# Patient Record
Sex: MALE | Race: WHITE
[De-identification: names, ages, dates, MRNs, and addresses within clinical notes are randomized per-mention and may not be internally consistent; named-entity substitution may affect disease eponyms.]

---

## 2018-08-20 ENCOUNTER — HOSPITAL ENCOUNTER (INPATIENT)
Dept: HOSPITAL 68 - SDA | Age: 53
Discharge: HOME HEALTH | DRG: 470 | End: 2018-08-20
Attending: ORTHOPAEDIC SURGERY | Admitting: ORTHOPAEDIC SURGERY
Payer: COMMERCIAL

## 2018-08-20 VITALS — HEIGHT: 70 IN | WEIGHT: 280 LBS | BODY MASS INDEX: 40.09 KG/M2

## 2018-08-20 VITALS — SYSTOLIC BLOOD PRESSURE: 146 MMHG | DIASTOLIC BLOOD PRESSURE: 84 MMHG

## 2018-08-20 VITALS — DIASTOLIC BLOOD PRESSURE: 86 MMHG | SYSTOLIC BLOOD PRESSURE: 154 MMHG

## 2018-08-20 VITALS — DIASTOLIC BLOOD PRESSURE: 90 MMHG | SYSTOLIC BLOOD PRESSURE: 160 MMHG

## 2018-08-20 DIAGNOSIS — I10: ICD-10-CM

## 2018-08-20 DIAGNOSIS — F41.9: ICD-10-CM

## 2018-08-20 DIAGNOSIS — M16.11: Primary | ICD-10-CM

## 2018-08-20 DIAGNOSIS — F32.9: ICD-10-CM

## 2018-08-20 DIAGNOSIS — E78.5: ICD-10-CM

## 2018-08-20 PROCEDURE — 0SR904A REPLACEMENT OF RIGHT HIP JOINT WITH CERAMIC ON POLYETHYLENE SYNTHETIC SUBSTITUTE, UNCEMENTED, OPEN APPROACH: ICD-10-PCS | Performed by: ORTHOPAEDIC SURGERY

## 2018-08-20 PROCEDURE — 2NBSP: CPT

## 2018-08-20 NOTE — PATIENT DISCHARGE INSTRUCTIONS
Discharge Instructions
 
General Discharge Information
You were seen/treated for:
Right hip pain
You had these procedures:
Right total hip arthroplasty
Watch for these problems:
Fever over 100.4
Drainage from wound
Redness and swelling around wound
Unable to bear weight on right lower extremity
Chest pain or shortness of breath
Call Surgeon to remove: Other
Do not soak the wound: Yes
No bath, but you may shower: Yes
Other wound care:
Daily dry dressing change
Special Instructions:
Okay to shower in 48hr
 
Diet
Continue normal diet: Yes
 
Activity
Activity Limited to: Weight bear as tolerated (with rolling walker)
 
Acute Coronary Syndrome
 
Inclusion Criteria
At DC or during hospital stay patient has or had the following:
ACS DIAGNOSIS No
 
Discharge Core Measures
Meds if any: Prescribed or Continued at Discharge
Meds if any: NOT Prescribed or Continued at Discharge
 
Congestive Heart Failure
 
Inclusion Criteria
At DC or during hospital stay patient has or had the following:
CHF DIAGNOSIS No
 
Discharge Core Measures
Meds if any: Prescribed or Continued at Discharge
Meds if any: NOT Prescribed or Continued at Discharge
 
Cerebrovascular accident
 
Inclusion Criteria
At DC or during hospital stay patient has or had the following:
CVA/TIA Diagnosis No
 
Discharge Core Measures
Meds if any: Prescribed or Continued at Discharge
Meds if any: NOT Prescribed or Continued at Discharge
 
Venous thromboembolism
 
Inclusion Criteria
VTE Diagnosis No
VTE Type NONE
VTE Confirmed by (Test) NONE
 
Discharge Core Measures
- Per Current guidelines, there needs to be overlap
- treatment for the first 5 days of Warfarin therapy.
- If discharged on Warfarin prior to 5 days of
- overlap therapy, the patient will need to be
- assessed for post discharge needs including
- *Post discharge parental anticoagulation
- *Warfarin and/or parental anticoagulation education
- *Follow up date to check INR post discharge
At least 5 days overlap therapy as Inpatient No
Meds if any: Prescribed or Continued at Discharge
Note: Overlap Therapy is Warfarin and Anticoagulant
Meds if any: NOT Prescribed or Continued at Discharge

## 2018-08-20 NOTE — OPERATIVE REPORT
Operative/Inv Procedure Report
Surgery Date: 08/20/18
Name of Procedure:
Right total hip replacement
Pre-Operative Diagnosis:
Primary right hip DJD
Post-Operative Diagnosis:
Same
Estimated Blood Loss: 300
Surgeon/Assistant:
Sanjuana WOOD,Ramesh Perry
Anesthesia: block
 
Operative/Procedure Note
Note:
Description of Procedure:
 
The patient was taken to the operating room and positively identified.  After 
induction of spinal anesthesia and administration of appropriate pre-operative 
antibiotics, the patient was positioned supine on the operating room table and 
all bony prominences were well padded.  After performing a surgical timeout, the
right lower extremity was prepped and draped in the usual sterile fashion.
 
A direct anterior approach was made to the right hip.  The incision was carried 
sharply through superficial soft tissues to the level of the fascia.  Meticulous
hemostasis was maintained with Bovie electocautery.  The fascia over the tensor 
fascia faith muscle was opened sharply and the interval between the TFL and the 
sartorius was entered bluntly taking care to stay lateral to the lateral femoral
cutaneous nerve.  Retractors were placed around the femoral neck and the 
pericapsular fat was identified.  The ascending branches of the lateral femoral 
circumflex vessels were identified and carefully coagulated.  The pericapsular 
fat and anterior capsule were then resected.  A napkin ring osteotomy was 
performed and the femoral head was removed without difficulty.
 
Attention was then turned to the acetabulum.  After appropriate placement of 
retractors, the acetabulum was exposed.  Soft tissue was cleaned from the 
acetabular margin and notch.  Overhanging osteophytes were removed and the 
teardrop was exposed.  The acetabulum was then sequentially reamed to accept a 
64 mm Marilyn Trident Tritanium hemispherical  shell.  This was impacted into 
place in the appropriate position and multiple screws were used for supplemental
fixation.  It was then fit with a 36 mm Trident X3 zero degree polyethylene 
insert.
 
Attention was then turned to the femur.  After performing the appropriate 
ligament releases, the proximal femur was exposed.  It was then sequentially 
broached to accept a size 9 Marilyn secure fit advanced 127 neck angle stem.  
This was trialed for leg length and stability.  The trial component was removed 
and the final component was impacted into place.  The trunnion was carefully 
cleaned and fit with a 36 mm, +0 Biolox delta ceramic femoral head.  The hip was
reduced and put through a full range of motion and found to be stable.
 
The articular space was then irrigated with sterile saline.  The periarticular 
soft tissues were infilitrated with Marcaine.  The fascial layer was closed with
interrupted #1 vicryl suture and the skin was re-approximated with interrupted 2
-0 vicryl.  The skin was closed with a running 3-0 V-Lock suture.  Steri-strips 
and a sterile dressing were applied.  The patient was awakened and taken to the 
recovery room in satisfactory condition.

## 2018-08-20 NOTE — RADIOLOGY REPORT
EXAMINATION:
XR HIP, RIGHT
 
CLINICAL INFORMATION:
Right total hip replacement.
 
COMPARISON:
None
 
TECHNIQUE:
AP and cross table lateral views of the right hip
 
FINDINGS:
There is a right total hip arthroplasty noted with the components in the
usual position. No periprosthetic fracture or suspicious area of lucency.
 
Gas\E\air in the soft tissues consistent with immediate postoperative state.
 
IMPRESSION:
Right total hip arthroplasty without complication by x-ray.

## 2018-08-20 NOTE — SURG SHORT-STAY <48HRS DIS SUM
Visit Information
 
Visit Dates
Admission Date:
08/20/18
 
Discharge Date:
8/20/2018
 
 
Surgical Short Stay DC Summary
Admission Diagnosis:
Right hip osteoarthritis
Final Diagnosis:
Same
Procedure(s):
Right total hip arthroplasty
Summary/Significant Findings:
Patient tolerated the procedure well.  Postoperatively he was tolerating regular
diet, voiding spontaneously, pain was well managed, ambulating using a rolling 
walker with physical therapy and was cleared for discharge to home with health 
services.
Patient was given instructions to follow-up with Dr. Arcos in 6 weeks and call
sooner with any questions or concerns
Condition at Discharge:
Good
Discharge Disposition: home health services
Discharge instructions provided to patient/family: Yes
Post discharge follow-up plan:
Follow-up with Dr. Arcos in 6 weeks, call sooner with any questions or 
concerns

## 2018-08-20 NOTE — PN- ORTHOPEDIC
Subjective
Subjective:
POC
 
Pt recovering well in PACU, minimal pain/ache in right knee but no hip pain.  
Denies nausea, CP/SOB.  Has not ambulated yet.  
Pt would like to home home otday if possible
 
Objective
Vital Signs and I&Os
VSS afebrile
Physical Exam:
gen- NAD
resp- clear
cardiac- RRR
abd- soft, NT
ext- right thigh is soft, dressing clean and dry, distal sensory and motor 
function intact, 2+ DP pulse. no calf tenderness
Current Medications:
 Current Medications
 
 
  Sig/Cyrus Start time  Last
 
Medication Dose Route Stop Time Status Admin
 
Acetaminophen 0 .STK-MED ONE 08/20 0715 DC 
 
  PO   
 
Acetaminophen 975 MG ONCE 08/20 0000 NR 
 
  PO 08/20 2359  
 
Amlodipine Besylate 10 MG DAILY 08/20 0900 AC 
 
  PO   
 
Aripiprazole 10 MG DAILY 08/20 0900 AC 
 
  PO   
 
Atenolol 25 MG DAILY 08/20 0900 AC 
 
  PO   
 
Atorvastatin Calcium 80 MG 1700 08/20 1700 AC 
 
  PO   
 
Bupropion HCl 150 MG BID 08/20 0900 AC 
 
  PO   
 
Cefazolin Sodium 3,000 MG ONCE 08/20 0000 NR 
 
  IV 08/20 2359  
 
Clonazepam 0.5 MG 4 TIMES/DAY 08/20 0900 AC 
 
  PO 08/27 0859  
 
Insulin Human Regular 0 TIDAC/HS 08/20 1200 AC 
 
  SC   
 
Lamotrigine 200 MG TID 08/20 0900 UNV 
 
  PO   
 
Lisinopril 20 MG DAILY 08/20 0900 AC 
 
  PO   
 
Midazolam HCl 0 .STK-MED ONE 08/20 0708 DC 
 
  .ROUTE   
 
Oxycodone HCl 0 .STK-MED ONE 08/20 0714 DC 
 
  PO   
 
Oxycodone HCl 10 MG ONCE 08/20 0000 NR 
 
  PO 08/20 2359  
 
Tranexamic Acid 0 .STK-MED ONE 08/20 0708 DC 
 
  IV   
 
 
 
 
Assessment/Plan
Assessment/Plan
54yo M with hx of htn and depression now SP R JENNIFER POD0, stable, recovering in 
PACU
 
pain management
PT- WBAT with rolling walker
reg diet
IVF
regular home meds
dvt ppx- alps and asa
dc planning- possible home today pending clearence by PT
 
 
Core Measures
 
Venous Thromboembolism
VTE Risk Factors Surgery
No Mechanical VTE Prophylaxis d/t N/A MechProphylax Ordered
No VTE Pharm Prophylaxis d/t NA PharmProphylax ordered

## 2018-08-20 NOTE — ADMISSION CORE MEASURES
Acute Coronary Syndrome (CM)
 
ACS Core Measures
Acute Coronary Syndrome Diagnosis No
 
Congestive Heart Failure (NEW)
 
CHF Core Measures
Congestive Heart Failure Diagnosis No
 
Cerebrovascular Accident AUG17
 
CVA Core Measures
CVA/TIA Diagnosis No
 
Venous Thromboembolism AUG17
 
VTE Core Krystal (View Protocol)
VTE Risk Factors Surgery
No Mechanical VTE Prophylaxis d/t N/A MechProphylax Ordered
No VTE Pharm Prophylaxis d/t NA PharmProphylax ordered
 
Problem List
 
As ranked by this Provider
includes Assessment & Plan
 1. Unilateral primary osteoarthritis, right hip
 
 
HOME MEDS
Home Med List
Amlodipine Besylate 10 MG TABLET   1 TAB PO DAILY HTN  (Reported)
Aripiprazole (Abilify) 10 MG TABLET   1 TAB PO DAILY DEPRESSION  (Reported)
Atenolol 25 MG TABLET   1 TAB PO DAILY HTN  (Reported)
Bupropion HCl (Wellbutrin Sr) 150 MG TABLET.ER   1 TAB PO BID DEPRESSION  (
Reported)
Clonazepam (Klonopin) 0.5 MG TABLET   1 TAB PO 4 TIMES/DAY ANXIETY  (Reported)
Insulin Glargine,Hum.rec.anlog (Basaglar Kwikpen U-100) 100 UNIT/ML (3 ML) 
INSULN.PEN   7 U BID DIABETES  (Reported)
Lamotrigine 200 MG TABLET   1 TAB PO TID UNKNOWN  (Reported)
Lisinopril 20 MG TABLET   1 TAB PO DAILY HTN  (Reported)
Rosuvastatin Calcium (Crestor) 20 MG TABLET   1 TAB PO DAILY CHOLESTEROL  (
Reported)